# Patient Record
Sex: MALE | Race: WHITE | Employment: FULL TIME | URBAN - METROPOLITAN AREA
[De-identification: names, ages, dates, MRNs, and addresses within clinical notes are randomized per-mention and may not be internally consistent; named-entity substitution may affect disease eponyms.]

---

## 2019-09-27 PROBLEM — E78.00 PURE HYPERCHOLESTEROLEMIA: Status: ACTIVE | Noted: 2019-09-27

## 2021-04-10 DIAGNOSIS — Z23 NEED FOR VACCINATION: ICD-10-CM

## 2021-08-13 PROBLEM — M79.641 PAIN IN BOTH HANDS: Status: ACTIVE | Noted: 2021-08-13

## 2021-08-13 PROBLEM — B35.1 ONYCHOMYCOSIS: Status: ACTIVE | Noted: 2021-08-13

## 2021-08-13 PROBLEM — M79.642 PAIN IN BOTH HANDS: Status: ACTIVE | Noted: 2021-08-13

## 2022-04-01 PROBLEM — E66.09 CLASS 1 OBESITY DUE TO EXCESS CALORIES WITHOUT SERIOUS COMORBIDITY WITH BODY MASS INDEX (BMI) OF 32.0 TO 32.9 IN ADULT: Status: ACTIVE | Noted: 2020-11-11

## 2022-04-01 PROBLEM — E66.811 CLASS 1 OBESITY DUE TO EXCESS CALORIES WITHOUT SERIOUS COMORBIDITY WITH BODY MASS INDEX (BMI) OF 32.0 TO 32.9 IN ADULT: Status: ACTIVE | Noted: 2020-11-11

## 2022-04-01 PROBLEM — M79.644 PAIN OF RIGHT THUMB: Status: ACTIVE | Noted: 2022-04-01

## 2022-04-01 PROBLEM — M17.0 PRIMARY OSTEOARTHRITIS OF BOTH KNEES: Status: ACTIVE | Noted: 2022-04-01

## 2024-08-20 ENCOUNTER — TELEPHONE (OUTPATIENT)
Dept: PHYSICAL THERAPY | Facility: HOSPITAL | Age: 79
End: 2024-08-20

## 2024-08-20 DIAGNOSIS — J38.3 VOCAL CORD DYSFUNCTION: ICD-10-CM

## 2024-08-20 DIAGNOSIS — R06.02 SHORTNESS OF BREATH: Primary | ICD-10-CM

## 2024-08-23 ENCOUNTER — TELEPHONE (OUTPATIENT)
Dept: PHYSICAL THERAPY | Facility: HOSPITAL | Age: 79
End: 2024-08-23

## 2024-08-26 ENCOUNTER — OFFICE VISIT (OUTPATIENT)
Dept: SPEECH THERAPY | Facility: HOSPITAL | Age: 79
End: 2024-08-26
Attending: OTOLARYNGOLOGY
Payer: MEDICARE

## 2024-08-26 DIAGNOSIS — R06.02 SHORTNESS OF BREATH: Primary | ICD-10-CM

## 2024-08-26 DIAGNOSIS — J38.3 VOCAL CORD DYSFUNCTION: ICD-10-CM

## 2024-08-26 PROCEDURE — 92524 BEHAVRAL QUALIT ANALYS VOICE: CPT

## 2024-08-26 NOTE — PROGRESS NOTES
VOCAL CORD DYSFUNCTION EVALUATION:     Diagnosis:   Shortness of breath [R06.02], Vocal cord dysfunction [J38.3]       Referring Provider: Rikki Pappas  Date of Evaluation:    8/26/2024    Precautions:  None Next MD visit:   none scheduled  Date of Surgery: n/a     Vocal cord dysfunction evaluation completed per MD order. Patient arrived on time. Patient participated actively in assessment tasks.     PATIENT SUMMARY   Sandor Frank is a 78 year old y/o male who presents to therapy today with complaints of vocal cord dysfunction. Patient reports that symptoms began after he had COVID and SARS in 2023. Patient reports persistent episodes of SOB upon exertion and at times when he is bending down when in the garden. He does have a neurogenic cough that occurs when he is drinking or eating cold foods. Patient denies pulmonary and concerns as he has consulted with specialists within these areas. Patient with ENT consult on 8/19/2024 with findings of VCD. Patient now presents to [facility] for evaluation of VCD.  Hospital/Rehab Course: N/A  Current vocal demands: occupational, social  Current functional limitations include shortness of breath upon exertion.   History of VCD and/or Chronic Cough: Past 1.5 years  Current Medications:   Omeprazole 40 MG Oral Capsule Delayed Release, daily., Disp: , Rfl:   montelukast 10 MG Oral Tab, Take 1 tablet (10 mg total) by mouth daily., Disp: 90 tablet, Rfl: 0  ROSUVASTATIN 10 MG Oral Tab, TAKE 1 TABLET BY MOUTH EVERY DAY AT NIGHT, Disp: 90 tablet, Rfl: 0  Fluticasone-Umeclidin-Vilant (TRELEGY ELLIPTA) 200-62.5-25 MCG/ACT Inhalation Aerosol Powder, Breath Activated, Inhale 1 puff into the lungs daily., Disp: 3 each, Rfl: 3  GLUCOSAMINE-CHONDROITIN OR, Take by mouth., Disp: , Rfl:   Loratadine (ALAVERT OR), Take by mouth., Disp: , Rfl:   albuterol 108 (90 Base) MCG/ACT Inhalation Aero Soln, Inhale 2 puffs into the lungs every 4 (four)  hours as needed for Wheezing., Disp: 1 each, Rfl: 0    Sandor describes prior level of function as WFL prior to COVID and SARS infection. Pt goals include improving airway patency.  Past medical history was reviewed with Sandor. Significant findings include   Past Medical History:    Esophageal reflux    Hyperlipidemia        ASSESSMENT  During the evaluation, Sandor presented with respiratory signs and symptoms most consistent with VCD including: difficulty inhaling during exercise, laryngeal tightness/tension during episodes of SOB, improved breathing when laryngeal tension decreases, quickly resolving breathing symptoms when activity ends.  Sandor would benefit from skilled Speech Therapy to address the above impairments to improve his ability to participate fully in his daily activities across environments.    OBJECTIVE:   Respiration: Diaphragmatic    Today's Treatment: Sandor was educated on the anatomy and physiology of breathing and phonation. he was then instructed in true vocal cord (TVC) movement during inhalation and exhalation. Pt then completed training in easy breathing strategies. Pt was able to complete quiet, round-lip breathing in 10/10 trials, given direct modeling and instruction by the clinician. he was also educated on strategies for preventing/remediating a VCD/chronic coughing attack, including exhalation followed by round lip breathing or a sniff-blow breathing. Pt required direct verbal instruction in order to complete this sequence. Pt was provided with a home exercise program verbally and in writing.      Charges: Mikal x1, [id number]      Total Timed Treatment: 35 min     Total Treatment Time: 35 min     PLAN  Recommendations:   1) Pt will be seen 3-5x for voice therapy over the duration of 2 months, at which time a treatment plan update will be submitted and further recommendations will be made.  2) Therapy will target easy breathing strategies, methods for preventing/remediating a VCD/chronic cough  attack, and laryngeal relaxation strategies. A home exercise program will also be provided in order to improve carry-over and generalization of goals to the home environment.  3)  F/u with ENT/pulmonology/Asthma and Allergy MD following completion of VCD therapy.    Goals:    1)  Pt will independently demonstrate easy breathing strategies and methods for preventing/remediating a VCD/chronic cough attack in 8/10 trials at rest.  2)  Pt will independently demonstrate easy breathing strategies during mild to moderate exertion in 8/10 trials.  3)  Pt will report improved breathing and decreased coughing across daily tasks for the duration of 1 month in order to improve his/her ability to breathe efficiently and fully participate in all daily activities.     Rehab Potential: Good for stated goals, pending compliance with the home exercise program.      Patient/Family/Caregiver was advised of these findings, precautions, and treatment options and has agreed to actively participate in planning and for this course of care.    Thank you for your referral. Please co-sign or sign and return this letter via fax as soon as possible to [phone]. If you have any questions, please contact me at Dept: [phone]    Sincerely,  Electronically signed by therapist: GLENDA Lopez  Physician's certification required: Yes  I certify the need for these services furnished under this plan of treatment and while under my care.    X___________________________________________________ Date____________________    Certification From: 8/26/2024  To:11/24/2024

## 2024-09-05 ENCOUNTER — OFFICE VISIT (OUTPATIENT)
Dept: SPEECH THERAPY | Facility: HOSPITAL | Age: 79
End: 2024-09-05
Attending: OTOLARYNGOLOGY
Payer: MEDICARE

## 2024-09-05 PROCEDURE — 92507 TX SP LANG VOICE COMM INDIV: CPT

## 2024-09-05 NOTE — PROGRESS NOTES
Diagnosis:   Shortness of breath [R06.02], Vocal cord dysfunction [J38.3]       Referring Provider: Rikki Pappas  Date of Evaluation:   8/26/2024    Precautions:  None Next MD visit:   none scheduled  Date of Surgery: n/a   Insurance Primary/Secondary: [employer] MCR / N/A       # Visits on POC: 5   Total Timed Treatment: 25 min  Date POC Expires: 11/24/2024  Total Treatment time: 25 min  Charges: [id number]   Treatment Number: 2    Subjective: Patient arrived on time to session. Patient participated actively in therapeutic tasks. Patient reports resolving symptoms of VCD (e.g., shortness of breath upon exertion).     Pain: 0/10. No pain reported on this date. Patient not being seen for pain.     Objective:  Goals:    1)  Pt will independently demonstrate easy breathing strategies and methods for preventing/remediating a VCD/chronic cough attack in 8/10 trials at rest.  Progress: 10/10 independently.    2)  Pt will independently demonstrate easy breathing strategies during mild to moderate exertion in 8/10 trials.  Progress: 10/10 independently. Practiced while walking up the stairs.    3)  Pt will report improved breathing and decreased coughing across daily tasks for the duration of 1 month in order to improve his ability to breathe efficiently and fully participate in all daily activities.  Progress: In progress. Patient to benefit from f/u phone call.      HEP: rescue/recovery and round lip breathing  Education: VCD breathing    Assessment: Patient presents with signs and symptoms most consistent with VCD including: difficulty inhaling during exercise, laryngeal tightness/tension during episodes of SOB, improved breathing when laryngeal tension decreases, quickly resolving breathing symptoms when activity ends. Patient's deficits impact his ability to maintain airway patency during daily activities. Patient reports significantly resolved symptoms and is independent with completion of VCD exercises. Patient to benefit  from f/u phone call in 1 month to determine maintained progress.      Plan: Continue speech-language therapy targeting vocal cord dysfunction. Patient to benefit from f/u phone call in 1 month.

## 2024-09-12 ENCOUNTER — APPOINTMENT (OUTPATIENT)
Dept: SPEECH THERAPY | Facility: HOSPITAL | Age: 79
End: 2024-09-12
Attending: OTOLARYNGOLOGY
Payer: MEDICARE

## 2024-09-19 ENCOUNTER — APPOINTMENT (OUTPATIENT)
Dept: SPEECH THERAPY | Facility: HOSPITAL | Age: 79
End: 2024-09-19
Attending: OTOLARYNGOLOGY
Payer: MEDICARE